# Patient Record
Sex: MALE | Race: WHITE | NOT HISPANIC OR LATINO | URBAN - METROPOLITAN AREA
[De-identification: names, ages, dates, MRNs, and addresses within clinical notes are randomized per-mention and may not be internally consistent; named-entity substitution may affect disease eponyms.]

---

## 2019-05-27 ENCOUNTER — OFFICE VISIT (OUTPATIENT)
Dept: RETAIL CLINIC | Facility: CLINIC | Age: 39
End: 2019-05-27

## 2019-05-27 VITALS
HEIGHT: 66 IN | BODY MASS INDEX: 30.82 KG/M2 | HEART RATE: 87 BPM | RESPIRATION RATE: 20 BRPM | WEIGHT: 191.8 LBS | TEMPERATURE: 97.5 F | SYSTOLIC BLOOD PRESSURE: 112 MMHG | DIASTOLIC BLOOD PRESSURE: 78 MMHG | OXYGEN SATURATION: 99 %

## 2019-05-27 DIAGNOSIS — H66.002 ACUTE SUPPURATIVE OTITIS MEDIA OF LEFT EAR WITHOUT SPONTANEOUS RUPTURE OF TYMPANIC MEMBRANE, RECURRENCE NOT SPECIFIED: Primary | ICD-10-CM

## 2019-05-27 DIAGNOSIS — H65.92 OTITIS MEDIA WITH EFFUSION, LEFT: ICD-10-CM

## 2019-05-27 PROCEDURE — 99203 OFFICE O/P NEW LOW 30 MIN: CPT | Performed by: NURSE PRACTITIONER

## 2019-05-27 RX ORDER — FLUOXETINE HYDROCHLORIDE 20 MG/1
CAPSULE ORAL
Refills: 1 | COMMUNITY
Start: 2019-04-30

## 2019-05-27 RX ORDER — AMOXICILLIN 500 MG/1
500 CAPSULE ORAL 3 TIMES DAILY
Qty: 30 CAPSULE | Refills: 0 | Status: SHIPPED | OUTPATIENT
Start: 2019-05-27 | End: 2019-06-06

## 2019-05-27 RX ORDER — FLUTICASONE PROPIONATE 50 MCG
2 SPRAY, SUSPENSION (ML) NASAL DAILY
COMMUNITY

## 2019-05-27 RX ORDER — MULTIPLE VITAMINS W/ MINERALS TAB 9MG-400MCG
1 TAB ORAL DAILY
COMMUNITY

## 2019-05-27 RX ORDER — METHYLPREDNISOLONE 4 MG/1
TABLET ORAL
Qty: 21 TABLET | Refills: 0 | Status: SHIPPED | OUTPATIENT
Start: 2019-05-27

## 2019-05-27 RX ORDER — TRAZODONE HYDROCHLORIDE 50 MG/1
TABLET ORAL
Refills: 1 | COMMUNITY
Start: 2019-04-30

## 2019-05-27 NOTE — PATIENT INSTRUCTIONS
"Otitis Media With Effusion  Otitis media with effusion is the presence of fluid in the middle ear. This is a common problem in children, which often follows ear infections. It may be present for weeks or longer after the infection. Unlike an acute ear infection, otitis media with effusion refers only to fluid behind the ear drum and not infection. Children with repeated ear and sinus infections and allergy problems are the most likely to get otitis media with effusion.  CAUSES   The most frequent cause of the fluid buildup is dysfunction of the eustachian tubes. These are the tubes that drain fluid in the ears to the back of the nose (nasopharynx).  SYMPTOMS   · The main symptom of this condition is hearing loss. As a result, you or your child may:  ¨ Listen to the TV at a loud volume.  ¨ Not respond to questions.  ¨ Ask \"what\" often when spoken to.  ¨ Mistake or confuse one sound or word for another.  · There may be a sensation of fullness or pressure but usually not pain.  DIAGNOSIS   · Your health care provider will diagnose this condition by examining you or your child's ears.  · Your health care provider may test the pressure in you or your child's ear with a tympanometer.  · A hearing test may be conducted if the problem persists.  TREATMENT   · Treatment depends on the duration and the effects of the effusion.  · Antibiotics, decongestants, nose drops, and cortisone-type drugs (tablets or nasal spray) may not be helpful.  · Children with persistent ear effusions may have delayed language or behavioral problems. Children at risk for developmental delays in hearing, learning, and speech may require referral to a specialist earlier than children not at risk.  · You or your child's health care provider may suggest a referral to an ear, nose, and throat surgeon for treatment. The following may help restore normal hearing:  ¨ Drainage of fluid.  ¨ Placement of ear tubes (tympanostomy tubes).  ¨ Removal of adenoids " (adenoidectomy).  HOME CARE INSTRUCTIONS   · Avoid secondhand smoke.  · Infants who are  are less likely to have this condition.  · Avoid feeding infants while they are lying flat.  · Avoid known environmental allergens.  · Avoid people who are sick.  SEEK MEDICAL CARE IF:   · Hearing is not better in 3 months.  · Hearing is worse.  · Ear pain.  · Drainage from the ear.  · Dizziness.  MAKE SURE YOU:   · Understand these instructions.  · Will watch your condition.  · Will get help right away if you are not doing well or get worse.  This information is not intended to replace advice given to you by your health care provider. Make sure you discuss any questions you have with your health care provider.  Document Released: 01/25/2006 Document Revised: 01/08/2016 Document Reviewed: 07/15/2014  [employer] Interactive Patient Education © 2017 [employer]  Otitis Media, Adult  Otitis media means that the middle ear is red and swollen (inflamed) and full of fluid. The condition usually goes away on its own.  Follow these instructions at home:  · Take over-the-counter and prescription medicines only as told by your doctor.  · If you were prescribed an antibiotic medicine, take it as told by your doctor. Do not stop taking the antibiotic even if you start to feel better.  · Keep all follow-up visits as told by your doctor. This is important.  Contact a doctor if:  · You have bleeding from your nose.  · There is a lump on your neck.  · You are not getting better in 5 days.  · You feel worse instead of better.  Get help right away if:  · You have pain that is not helped with medicine.  · You have swelling, redness, or pain around your ear.  · You get a stiff neck.  · You cannot move part of your face (paralyzed).  · You notice that the bone behind your ear hurts when you touch it.  · You get a very bad headache.  Summary  · Otitis media means that the middle ear is red, swollen, and full of fluid.  · This condition  usually goes away on its own. In some cases, treatment may be needed.  · If you were prescribed an antibiotic medicine, take it as told by your doctor.  This information is not intended to replace advice given to you by your health care provider. Make sure you discuss any questions you have with your health care provider.  Document Released: 06/05/2009 Document Revised: 01/08/2018 Document Reviewed: 01/08/2018  [employer] Interactive Patient Education © 2019 [employer]

## 2019-05-27 NOTE — PROGRESS NOTES
Subjective   Papi Chamberlain is a 38 y.o. male.   Chief Complaint   Patient presents with   • Earache      39 yo w/m presents with complaint of left ear pain duration of 2 weeks.  He reports calling in to telehealth and starting flonase 1 week ago without relief.  Earpain has gradually worsened to 8/10.  Refer to HPI/ROS for additional information.      Earache    There is pain in the left ear. This is a new problem. The current episode started 1 to 4 weeks ago. The problem occurs constantly. The problem has been gradually worsening. There has been no fever. The pain is at a severity of 8/10. Pertinent negatives include no ear discharge, headaches, rhinorrhea or sore throat. Treatments tried: flonase. The treatment provided no relief.        The following portions of the patient's history were reviewed and updated as appropriate: allergies, current medications, past family history, past medical history, past social history, past surgical history and problem list.    Current Outpatient Medications:   •  fluticasone (FLONASE) 50 MCG/ACT nasal spray, 2 sprays into the nostril(s) as directed by provider Daily., Disp: , Rfl:   •  Multiple Vitamins-Minerals (MULTIVITAMIN WITH MINERALS) tablet tablet, Take 1 tablet by mouth Daily., Disp: , Rfl:   •  amoxicillin (AMOXIL) 500 MG capsule, Take 1 capsule by mouth 3 (Three) Times a Day for 10 days., Disp: 30 capsule, Rfl: 0  •  FLUoxetine (PROzac) 20 MG capsule, TK 1 C PO D, Disp: , Rfl: 1  •  methylPREDNISolone (MEDROL, STANISLAV,) 4 MG tablet, Take as directed on package instructions., Disp: 21 tablet, Rfl: 0  •  traZODone (DESYREL) 50 MG tablet, TK 1 TO 2 TS PO HS, Disp: , Rfl: 1    Review of Systems   Constitutional: Positive for activity change.   HENT: Positive for ear pain. Negative for ear discharge, postnasal drip, rhinorrhea, sinus pressure, sinus pain, sneezing and sore throat.    Eyes: Negative.    Respiratory: Negative.    Cardiovascular: Negative.    Skin: Negative.   "  Neurological: Negative for headaches.   Psychiatric/Behavioral: Negative.      /78   Pulse 87   Temp 97.5 °F (36.4 °C)   Resp 20   Ht 167.6 cm (66\")   Wt 87 kg (191 lb 12.8 oz)   SpO2 99%   BMI 30.96 kg/m²     Objective   Allergies   Allergen Reactions   • Morphine Nausea And Vomiting       Physical Exam   Constitutional: He is oriented to person, place, and time. He appears well-developed and well-nourished.   HENT:   Head: Normocephalic and atraumatic.   Right Ear: Hearing, tympanic membrane, external ear and ear canal normal.   Left Ear: External ear normal. Tympanic membrane is erythematous and bulging. A middle ear effusion is present.   Nose: Nose normal.   Mouth/Throat: Oropharynx is clear and moist. No oropharyngeal exudate.   Eyes: Conjunctivae are normal.   Neck: Normal range of motion. Neck supple.   Cardiovascular: Normal rate, regular rhythm and normal heart sounds.   Pulmonary/Chest: Effort normal and breath sounds normal. No stridor. No respiratory distress. He has no wheezes. He has no rales.   Lymphadenopathy:        Head (left side): Preauricular adenopathy present.     He has cervical adenopathy.   Neurological: He is alert and oriented to person, place, and time.   Skin: Skin is warm. Capillary refill takes less than 2 seconds.   Psychiatric: He has a normal mood and affect. His behavior is normal. Judgment and thought content normal.   Vitals reviewed.      Assessment/Plan   Papi was seen today for earache.    Diagnoses and all orders for this visit:    Acute suppurative otitis media of left ear without spontaneous rupture of tympanic membrane, recurrence not specified    Otitis media with effusion, left    Other orders  -     methylPREDNISolone (MEDROL, STANISLAV,) 4 MG tablet; Take as directed on package instructions.  -     amoxicillin (AMOXIL) 500 MG capsule; Take 1 capsule by mouth 3 (Three) Times a Day for 10 days.       An After Visit Summary was printed, reviewed, and given to " the patient. Understanding verbalized and agrees with treatment plan.  If no improvement or becomes worse, follow up with primary or go to [facility]/ER.              May 27, 2019 11:48 AM